# Patient Record
Sex: FEMALE | Race: OTHER | Employment: UNEMPLOYED | URBAN - METROPOLITAN AREA
[De-identification: names, ages, dates, MRNs, and addresses within clinical notes are randomized per-mention and may not be internally consistent; named-entity substitution may affect disease eponyms.]

---

## 2018-04-12 ENCOUNTER — NURSE TRIAGE (OUTPATIENT)
Dept: ADMINISTRATIVE | Facility: CLINIC | Age: 38
End: 2018-04-12

## 2020-01-18 ENCOUNTER — NURSE TRIAGE (OUTPATIENT)
Dept: ADMINISTRATIVE | Facility: CLINIC | Age: 40
End: 2020-01-18

## 2020-05-18 ENCOUNTER — TELEPHONE (OUTPATIENT)
Dept: PODIATRY | Facility: CLINIC | Age: 40
End: 2020-05-18

## 2020-05-18 NOTE — TELEPHONE ENCOUNTER
----- Message from Dmitry Carrasquillo sent at 5/18/2020  4:08 PM CDT -----  Contact: [facility]   [facility] needs a call concerning pt Endo form    Contact

## 2020-05-18 NOTE — TELEPHONE ENCOUNTER
----- Message from Dmitry Carrasquillo sent at 5/18/2020  4:08 PM CDT -----  Contact: [facility]   [facility] needs a call concerning pt Endo form    Contact

## 2020-05-19 ENCOUNTER — TELEPHONE (OUTPATIENT)
Dept: PODIATRY | Facility: CLINIC | Age: 40
End: 2020-05-19

## 2020-07-30 ENCOUNTER — TELEPHONE (OUTPATIENT)
Dept: PODIATRY | Facility: CLINIC | Age: 40
End: 2020-07-30

## 2020-07-30 NOTE — TELEPHONE ENCOUNTER
Nurse Spoke with [facility] at [facility] who is concerned that patient does not have any shoes to wear after her football is complete. Nurse is advised that DPM will be notified      ----- Message from Dmitry Carrasquillo sent at 7/30/2020 10:04 AM CDT -----  Contact: [facility]   Angelika     Question on discharge on next week      Contact  [phone]

## 2021-05-18 ENCOUNTER — TELEPHONE (OUTPATIENT)
Dept: FAMILY MEDICINE | Facility: CLINIC | Age: 41
End: 2021-05-18

## 2023-05-08 ENCOUNTER — TELEPHONE (OUTPATIENT)
Dept: ENDOCRINOLOGY | Facility: CLINIC | Age: 43
End: 2023-05-08

## 2023-05-08 NOTE — TELEPHONE ENCOUNTER
----- Message from Ana Mullen NP sent at 5/8/2023  1:18 PM CDT -----  Regarding: FW: call back  Contact: July   [phone]    ----- Message -----  From: Aylin Gannon  Sent: 5/8/2023  10:51 AM CDT  To: [facility] Staff  Subject: call back                                        July calling regarding paperwork that was faxed over requesting it to be sign an dated please call to discuss Further

## 2024-07-26 ENCOUNTER — TELEPHONE (OUTPATIENT)
Dept: ENDOCRINOLOGY | Facility: CLINIC | Age: 44
End: 2024-07-26

## 2024-07-26 NOTE — TELEPHONE ENCOUNTER
----- Message from Nilda Martinez sent at 7/26/2024  9:17 AM CDT -----  [employer] calling for a PA for  a sensor       Callback [phone]